# Patient Record
Sex: MALE | Race: WHITE | Employment: UNEMPLOYED | URBAN - METROPOLITAN AREA
[De-identification: names, ages, dates, MRNs, and addresses within clinical notes are randomized per-mention and may not be internally consistent; named-entity substitution may affect disease eponyms.]

---

## 2017-05-24 ENCOUNTER — ANESTHESIA EVENT (OUTPATIENT)
Dept: SURGERY | Age: 27
End: 2017-05-24
Payer: OTHER GOVERNMENT

## 2017-05-24 PROBLEM — L40.9 PSORIASIS: Status: ACTIVE | Noted: 2017-05-24

## 2017-05-24 PROBLEM — M67.51 PLICA SYNDROME OF RIGHT KNEE: Status: ACTIVE | Noted: 2017-05-24

## 2017-05-24 PROBLEM — S83.241A TEAR OF MEDIAL MENISCUS OF RIGHT KNEE: Status: ACTIVE | Noted: 2017-05-24

## 2017-05-24 NOTE — DISCHARGE INSTRUCTIONS
DR. Mark Anthony Caballero    POST OPERATIVE INSTRUCTIONS FOLLOWING ARTHROSCOPIC SURGERY OF KNEE  In order to continue your care at home, please follow these guidelines from Dr. Merly Sullivan. **Please do not drive, operate any machinery, or make important business decisions for twenty four hours after surgery due to the medications you have received. 1. First meal at home should be clear liquids and return to regular diet as tolerated. 2. An Active ice wrap is provided for your knee. Re-freeze it in your home freezer a necessary. Wear it for the first 2-3 days to decrease swelling and pain, then use as desired. 3. Elevate the surgical leg when sitting and sleeping to reduce swelling. DO NOT place a pillow directly under the knee, place it under your leg. 4. Practice quadriceps muscle tightening and straight leg raise exercises and ankle pumps several times each hour. Bend your knee as often as you need to for comfort. Remove the ice wrap to bend the knee at least 3 times daily. 5. Let pain be your guide to activity; too much pain, too much activity. 6. You will be given crutches in the hospital to use until you see Dr. Merly Sullivan. Begin weight bearing as tolerated with 2 crutches. Instructions:  Crutches, bad leg, good leg, flat foot. 7. A prescription will be provided for pain medication before leaving the office. An antiinflammatory may also be prescribed. Use Tylenol for headaches. AVOID aspirin and ibuprofen or other NSAID use. Please inform us of any known drug allergies. 8. You may shower 48 hours after surgery. The dressing may be removed after 48 hours. Cover the small incisions with band-aids. Change band-aids after your shower daily. DO NOT remove the steri-strips that cover your sutures if you have an open incision. 9. You have been given a follow-up appointment date. If this is not feasible or you have forgotten, please call the office the day after surgery to obtain an appointment. DO NOT swim, take a tub bath, or whirlpool, or immerse your wound until your first post operative check in the office. 10. REMINDER:  Begin your physical therapy as instructed at your teaching sessions. 11. If you develop a fever greater than 101 degrees, unexpected redness, or swelling in your lower extremity please call the office immediately. DISCHARGE SUMMARY from Your Nurse    PATIENT INSTRUCTIONS:    AFTER ANESTHESIA & SEDATION, and WHILE TAKING PAIN MEDICINE  After general anesthesia / intravenous sedation and the 24 hours following, and/or while taking prescription Opiates:  · Limit your activities  · Do not drive and operate hazardous machinery until you have been of all narcotics and sedatives for over 24 hours  · Do not make important personal or business decisions  · Do  not drink alcoholic beverages  · If you have not urinated within 8 hours after discharge, please contact your surgeon on call. SIGNS OF INFECTION  Report the following Signs of Infection or General Problems after surgery to your surgeon:  · Excessive pain, swelling, redness or odor of or around the surgical area  · Temperature over 101; Temperature over 100 if on medications that affect your ability to fight infections  · Nausea and vomiting lasting longer than 4 hours or if unable to take medications  · Any signs of decreased circulation or nerve impairment to extremity: change in color, persistent  numbness, tingling, coldness or increase pain  · If you have any questions. COUGH AND DEEP BREATHE  Breathing deep and coughing are very important exercises to do after surgery. Deep breathing and coughing open the little air tubes and air sacks in your lungs. You take deep breaths every day. You may not even notice - it is just something you do when you sigh or yawn. It is a natural exercise you do to keep these air passages open. After surgery, take deep breaths and cough, on purpose.       Coughing and deep breathing help prevent bronchitis and pneumonia after surgery. If you had chest or belly surgery, use a pillow as a \"hug naima\" and hold it tightly to your chest or belly when you cough. DIRECTIONS:  1. Take 10 to 15 slow deep breaths every hour while awake. 2. Breathe in deeply, and hold it for 2 seconds. 3. Exhale slowly through puckered lips, like blowing up a balloon. 4. After every 4th or 5th deep breath, hug your pillow to your chest or belly and give a hard, deep cough. Yes, it will probably hurt. But doing this exercise is very important part of healing after surgery. Take your pain medicine to help you do this exercise without too much pain. ANKLE PUMPS    Ankle pumps increase the circulation of oxygenated blood to your lower extremities and decrease your risk for circulation problems such as blood clots. They also stretch the muscles, tendons and ligaments in your foot and ankle, and prevent joint contracture in the ankle and foot, especially after surgeries on the legs. It is important to do ankle pump exercises regularly after surgery because immobility increases your risk for developing a blood clot. Your doctor may also have you take an Aspirin for the next few days as well. If your doctor did not ask you to take an Aspirin, consult with him before starting Aspirin therapy on your own. The exercise is quite simple. · Slowly point your foot forward, feeling the muscles on the top of your lower leg stretch, and hold this position for 5 seconds. · Next, pull your foot back toward you as far as possible, stretching the calf muscles, and hold that position for 5 seconds. · Repeat with the other foot. · Perform 10 repetitions every hour while awake for both ankles if possible (down and then up with the foot once is one repetition). You should feel gentle stretching of the muscles in your lower leg when doing this exercise.   If you feel pain, or your range of motion is limited, don't push too hard. Only go the limit your joint and muscles will let you go. If you have increasing pain, progressively worsening leg warmth or swelling, STOP the exercise and call your doctor. Other Wound Care information:    Cold Therapy Instructions    Your nurse will provide a cold therapy wrap based upon your surgery, need, and your doctor's orders. INSTRUCTIONS FOR USE:  All cooling wraps produce sustained periods of intense cold. NEVER PLACE PAD ON BARE SKIN OR HAVE CONTACT WITH BARE SKIN  Always Use An Insulating Barrier. Tissue injury can occur if these devices are used improperly. Follow your surgeons instructions carefully regarding the frequency, duration and breaks from cold therapy, and the total length of treatment. Check under the pad barrier every 2 hours for skin injury (see below.)      SIGNS OF SKIN INJURY:  STOP USE AND CONTACT YOUR SURGEON if any of the following occur: Increased pain, burning, increased swelling, itching, blisters, increased redness, discoloration, welts, or other change in skin condition. INDICATIONS:  Back, Hip, Knee or Shoulder Surgery and Post-Operative Treatment; Trauma; Orthopedic Rehabilitation      CONTRAINDICATIONS:  Cold therapy should not be used by persons with Diabetes, Raynaud's or other vasospastic disease, cold hypersensititvity, or compromised local circulation. Certain medical conditions make cold-induced injury more likely. Please consult with your healthcare provider before use. Below is information on the medication(s) your doctor is prescribing for you: The maximum daily dose of acetaminophen was discussed with the patient.  He was encouraged not to exceed 3,000 mg of acetaminophen during a 24 hour period and was asked to keep in mind that acetaminophen can also be found in many over-the-counter cold medications as well as narcotics that may be given for pain. The patient expresses understanding of these issues and questions were answered. 4 THINGS ABOUT PAIN MEDICINE I ALWAYS TALK ABOUT:  There are 4 side effects I always talk about for pain medications. 1. They make you sleepy and drowsy. Do not drive a car or operate machines while taking pain medication. Do not make any major decisions. Take a nap. Relax. Let your body recover from the affects of anesthesia and surgery. 2. Some people have quite a problem with itching and. ..  3. Nausea or vomiting. I mention these together because research tends to suggest there is a gene-related issue. While some have a hard time with these problems, others do not. These are expected and know side effects. Over-the-counter Benadryl® (the drug name is diphenhydramine) can help with the itching. Your doctor may also have given you some medicine for nausea. IF HE DID NOT, CALL HIM/HER. 4. Last but not least is the problem of constipation. All pain medicine can slow down the movement of food through the gut. The slower it goes, the worse it can be. Frankly, this means very hard poop. Hard poop just adds insult to the injury of surgery. And if you had tummy surgery, like having your gall bladder removed or a hernia repair, YOU DO NOT WANT THIS PROBLEM. There are 4 things I recommend. · Drink lots of fluids. For healthy people with no heart problems, this means at least 64 ounces of liquids or more per day. For example, a \"Big Gulp®\" from [employer] is 32 ounces. So you need to drink at least 2  \"Big Gulp®\" worth of fluids every day. If you have heart problems you may not be able to do this. Talk to your doctor about what you should do to prevent constipation. · Drinking fruit juice like apple, pear, or prune juice gives you extra \"BANG\" for your beverage. These drinks are high in natural fiber.   If you are a diabetic, drink sugar-free fluids with fiber additives (see next 2 points.) Avoid drinking extra fruit juice unless this is a regular part of your diet plan. · Eat extra fresh fruits and vegetables. · Add extra fiber-products. Fiber products like Metamucil®, Citrucel® or Benefiber® can help. These products are over-the-counter and you do not need a prescription from your doctor. If you have followed these recommendations and still have some difficulty having a good poop, take and over-the-counter stimulant like Dulcolax® (biscodyl)  or Senokot® (senna concentrate). These may help get things moving. Andrea Zamora MEDICATION AND   SIDE EFFECT GUIDE    The [facility] MEDICATION AND SIDE EFFECT GUIDE was provided to the PATIENT AND CARE PROVIDER. Information provided includes instruction about drug purpose and common side effects for the following medications:   · Oscar Maria  · NAPROXEN        Medication information added to discharge record on May 25, 2017 at 8:58 AM.      Some information we wish all of our patients to be familiar with and General Information for Healthy Lifestyle choices:    · Make a list of your current medications with your Primary Care Provider. · Update this list whenever your medications are discontinued, doses are changed, or new medications (including over-the-counter products like ibuprofen, vitamins, or herbal remedies) are added. · Carry medication information at all times in case of emergency situations      No smoking / No tobacco products / Avoid exposure to second hand smoke    Surgeon General's Warning:  Quitting smoking now greatly reduces serious risk to your health. Obesity, smoking, and sedentary lifestyle greatly increases your risk for illness. A healthy diet, regular physical exercise & weight monitoring are important for maintaining a healthy lifestyle. A Note About Congestive Heart Failure:   You may be retaining fluid if you have a history of heart failure or if you experience any of the following symptoms:      · Weight gain of 3 pounds or more overnight or 5 pounds in a week  · Increased swelling in our hands or feet  · Shortness of breath while lying flat in bed      Please call your doctor as soon as you notice any of these symptoms; do not wait until your next office visit. A Note About Strokes:  Recognize signs and symptoms of STROKE. The simple mnemonic, F.A.S.T., can help you remember signs of a stroke and what to do if you suspect a stroke is occuring to you or someone you are with:    F - Face looks uneven  A - Arms unable to move, or move evenly  S - Speech is slurred or non-existent  T - Time - CALL 911 as soon as signs and symptoms begin - DO NOT go to bed or wait to see if you get better - TIME IS BRAIN. Warning Signs of HEART ATTACK   Call 911 if you have these symptoms:     Chest discomfort. Most heart attacks involve discomfort in the center of the chest that lasts more than a few minutes, or that goes away and comes back. It can feel like uncomfortable pressure, squeezing, fullness, or pain.  Discomfort in other areas of the upper body. Symptoms can include pain or discomfort in one or both arms, the back, neck, jaw, or stomach.  Shortness of breath with or without chest discomfort.  Other signs may include breaking out in a cold sweat, nausea, or lightheadedness. Don't wait more than five minutes to call 911 - MINUTES MATTER! Fast action can save your life. Calling 911 is almost always the fastest way to get lifesaving treatment. Emergency Medical Services staff can begin treatment when they arrive -- up to an hour sooner than if someone gets to the hospital by car. AT THE COMPLETION OF DISCHARGE INSTRUCTION REVIEW, WE VERIFY:  The discharge information has been reviewed with the patient and caregiver. Questions have been asked and answered meeting patient and caregiver expectations. The patient and caregiver verbalized understanding.     Your discharge nurse was Arlene HELM, RN-BC       Board Certified - Pain Management      Other information found in your discharge envelope:  [x]     PRESCRIPTIONS  []     PHYSICAL THERAPY PRESCRIPTION  []     APPOINTMENT CARDS  []     [facility]ional Anesthesia Pamphlet for block or block with On-Q Catheter from Anesthesia  Service  [x]     Medical device information sheets/pamphlets from their    []     School/work excuse note. []     /parent work excuse note. The following personal items collected during your admission for safe keeping are returned to you:     Dental Appliance: Dental Appliances: None  Vi gracy: Visual Aid: None  Hearing Aid:    Jewelry: Jewelry: None  Clothing: Clothing: Shirt, Shorts, Footwear, Undergarments, Socks  Other Valuables:  Other Valuables: None  Valuables sent to safe: Personal Items Sent to Safe: none

## 2017-05-25 ENCOUNTER — ANESTHESIA (OUTPATIENT)
Dept: SURGERY | Age: 27
End: 2017-05-25
Payer: OTHER GOVERNMENT

## 2017-05-25 ENCOUNTER — HOSPITAL ENCOUNTER (OUTPATIENT)
Age: 27
Setting detail: OUTPATIENT SURGERY
Discharge: HOME | End: 2017-05-25
Attending: ORTHOPAEDIC SURGERY | Admitting: ORTHOPAEDIC SURGERY
Payer: OTHER GOVERNMENT

## 2017-05-25 VITALS
HEART RATE: 79 BPM | BODY MASS INDEX: 34.1 KG/M2 | OXYGEN SATURATION: 90 % | RESPIRATION RATE: 18 BRPM | TEMPERATURE: 98 F | HEIGHT: 78 IN | DIASTOLIC BLOOD PRESSURE: 81 MMHG | WEIGHT: 294.75 LBS | SYSTOLIC BLOOD PRESSURE: 134 MMHG

## 2017-05-25 PROCEDURE — 76060000061 HC AMB SURG ANES 0.5 TO 1 HR: Performed by: ORTHOPAEDIC SURGERY

## 2017-05-25 PROCEDURE — 77030011930 HC WND ARTHRO ABLT S&N -C: Performed by: ORTHOPAEDIC SURGERY

## 2017-05-25 PROCEDURE — 74011250636 HC RX REV CODE- 250/636: Performed by: ANESTHESIOLOGY

## 2017-05-25 PROCEDURE — 76030000000 HC AMB SURG OR TIME 0.5 TO 1: Performed by: ORTHOPAEDIC SURGERY

## 2017-05-25 PROCEDURE — 74011000250 HC RX REV CODE- 250

## 2017-05-25 PROCEDURE — 77030032435

## 2017-05-25 PROCEDURE — 74011250636 HC RX REV CODE- 250/636: Performed by: ORTHOPAEDIC SURGERY

## 2017-05-25 PROCEDURE — 74011250636 HC RX REV CODE- 250/636

## 2017-05-25 PROCEDURE — 77030020782 HC GWN BAIR PAWS FLX 3M -B

## 2017-05-25 PROCEDURE — 77030002916 HC SUT ETHLN J&J -A: Performed by: ORTHOPAEDIC SURGERY

## 2017-05-25 PROCEDURE — 76210000040 HC AMBSU PH I REC FIRST 0.5 HR: Performed by: ORTHOPAEDIC SURGERY

## 2017-05-25 PROCEDURE — 76210000050 HC AMBSU PH II REC 0.5 TO 1 HR: Performed by: ORTHOPAEDIC SURGERY

## 2017-05-25 PROCEDURE — 77030028907 HC WRP KNEE WO BGS SOLM -B

## 2017-05-25 PROCEDURE — 77030008495 HC TBNG ARTHSC IRR CNMD -B: Performed by: ORTHOPAEDIC SURGERY

## 2017-05-25 PROCEDURE — 77030018835 HC SOL IRR LR ICUM -A: Performed by: ORTHOPAEDIC SURGERY

## 2017-05-25 PROCEDURE — 77030020143 HC AIRWY LARYN INTUB CGAS -A: Performed by: ANESTHESIOLOGY

## 2017-05-25 PROCEDURE — 77030000032 HC CUF TRNQT ZIMM -B: Performed by: ORTHOPAEDIC SURGERY

## 2017-05-25 PROCEDURE — 77030006877 HC BLD SHV FLL RAD S&N -B: Performed by: ORTHOPAEDIC SURGERY

## 2017-05-25 PROCEDURE — 74011000250 HC RX REV CODE- 250: Performed by: ORTHOPAEDIC SURGERY

## 2017-05-25 PROCEDURE — 77030012893

## 2017-05-25 RX ORDER — LIDOCAINE HYDROCHLORIDE 10 MG/ML
0.1 INJECTION, SOLUTION EPIDURAL; INFILTRATION; INTRACAUDAL; PERINEURAL AS NEEDED
Status: DISCONTINUED | OUTPATIENT
Start: 2017-05-25 | End: 2017-05-25 | Stop reason: HOSPADM

## 2017-05-25 RX ORDER — FENTANYL CITRATE 50 UG/ML
INJECTION, SOLUTION INTRAMUSCULAR; INTRAVENOUS AS NEEDED
Status: DISCONTINUED | OUTPATIENT
Start: 2017-05-25 | End: 2017-05-25 | Stop reason: HOSPADM

## 2017-05-25 RX ORDER — DEXAMETHASONE SODIUM PHOSPHATE 4 MG/ML
INJECTION, SOLUTION INTRA-ARTICULAR; INTRALESIONAL; INTRAMUSCULAR; INTRAVENOUS; SOFT TISSUE AS NEEDED
Status: DISCONTINUED | OUTPATIENT
Start: 2017-05-25 | End: 2017-05-25 | Stop reason: HOSPADM

## 2017-05-25 RX ORDER — PROPOFOL 10 MG/ML
INJECTION, EMULSION INTRAVENOUS AS NEEDED
Status: DISCONTINUED | OUTPATIENT
Start: 2017-05-25 | End: 2017-05-25 | Stop reason: HOSPADM

## 2017-05-25 RX ORDER — LIDOCAINE HYDROCHLORIDE 20 MG/ML
INJECTION, SOLUTION EPIDURAL; INFILTRATION; INTRACAUDAL; PERINEURAL AS NEEDED
Status: DISCONTINUED | OUTPATIENT
Start: 2017-05-25 | End: 2017-05-25 | Stop reason: HOSPADM

## 2017-05-25 RX ORDER — SODIUM CHLORIDE, SODIUM LACTATE, POTASSIUM CHLORIDE, CALCIUM CHLORIDE 600; 310; 30; 20 MG/100ML; MG/100ML; MG/100ML; MG/100ML
100 INJECTION, SOLUTION INTRAVENOUS CONTINUOUS
Status: DISCONTINUED | OUTPATIENT
Start: 2017-05-25 | End: 2017-05-25 | Stop reason: HOSPADM

## 2017-05-25 RX ORDER — SODIUM CHLORIDE 0.9 % (FLUSH) 0.9 %
5-10 SYRINGE (ML) INJECTION EVERY 8 HOURS
Status: DISCONTINUED | OUTPATIENT
Start: 2017-05-25 | End: 2017-05-25 | Stop reason: HOSPADM

## 2017-05-25 RX ORDER — ONDANSETRON 2 MG/ML
4 INJECTION INTRAMUSCULAR; INTRAVENOUS AS NEEDED
Status: DISCONTINUED | OUTPATIENT
Start: 2017-05-25 | End: 2017-05-25 | Stop reason: HOSPADM

## 2017-05-25 RX ORDER — MIDAZOLAM HYDROCHLORIDE 1 MG/ML
INJECTION, SOLUTION INTRAMUSCULAR; INTRAVENOUS AS NEEDED
Status: DISCONTINUED | OUTPATIENT
Start: 2017-05-25 | End: 2017-05-25 | Stop reason: HOSPADM

## 2017-05-25 RX ORDER — SODIUM CHLORIDE 0.9 % (FLUSH) 0.9 %
5-10 SYRINGE (ML) INJECTION AS NEEDED
Status: DISCONTINUED | OUTPATIENT
Start: 2017-05-25 | End: 2017-05-25 | Stop reason: HOSPADM

## 2017-05-25 RX ORDER — HYDROMORPHONE HYDROCHLORIDE 1 MG/ML
.25-1 INJECTION, SOLUTION INTRAMUSCULAR; INTRAVENOUS; SUBCUTANEOUS
Status: DISCONTINUED | OUTPATIENT
Start: 2017-05-25 | End: 2017-05-25 | Stop reason: HOSPADM

## 2017-05-25 RX ORDER — SODIUM CHLORIDE, SODIUM LACTATE, POTASSIUM CHLORIDE, CALCIUM CHLORIDE 600; 310; 30; 20 MG/100ML; MG/100ML; MG/100ML; MG/100ML
125 INJECTION, SOLUTION INTRAVENOUS CONTINUOUS
Status: DISCONTINUED | OUTPATIENT
Start: 2017-05-25 | End: 2017-05-25 | Stop reason: HOSPADM

## 2017-05-25 RX ORDER — IBUPROFEN 200 MG
400 TABLET ORAL
COMMUNITY

## 2017-05-25 RX ORDER — DIPHENHYDRAMINE HYDROCHLORIDE 50 MG/ML
12.5 INJECTION, SOLUTION INTRAMUSCULAR; INTRAVENOUS AS NEEDED
Status: DISCONTINUED | OUTPATIENT
Start: 2017-05-25 | End: 2017-05-25 | Stop reason: HOSPADM

## 2017-05-25 RX ORDER — ONDANSETRON 2 MG/ML
INJECTION INTRAMUSCULAR; INTRAVENOUS AS NEEDED
Status: DISCONTINUED | OUTPATIENT
Start: 2017-05-25 | End: 2017-05-25 | Stop reason: HOSPADM

## 2017-05-25 RX ADMIN — FENTANYL CITRATE 50 MCG: 50 INJECTION, SOLUTION INTRAMUSCULAR; INTRAVENOUS at 07:53

## 2017-05-25 RX ADMIN — HYDROMORPHONE HYDROCHLORIDE 1 MG: 1 INJECTION, SOLUTION INTRAMUSCULAR; INTRAVENOUS; SUBCUTANEOUS at 09:38

## 2017-05-25 RX ADMIN — HYDROMORPHONE HYDROCHLORIDE 1 MG: 1 INJECTION, SOLUTION INTRAMUSCULAR; INTRAVENOUS; SUBCUTANEOUS at 09:20

## 2017-05-25 RX ADMIN — DEXAMETHASONE SODIUM PHOSPHATE 4 MG: 4 INJECTION, SOLUTION INTRA-ARTICULAR; INTRALESIONAL; INTRAMUSCULAR; INTRAVENOUS; SOFT TISSUE at 08:15

## 2017-05-25 RX ADMIN — LIDOCAINE HYDROCHLORIDE 80 MG: 20 INJECTION, SOLUTION EPIDURAL; INFILTRATION; INTRACAUDAL; PERINEURAL at 07:58

## 2017-05-25 RX ADMIN — ONDANSETRON 4 MG: 2 INJECTION INTRAMUSCULAR; INTRAVENOUS at 08:15

## 2017-05-25 RX ADMIN — FENTANYL CITRATE 50 MCG: 50 INJECTION, SOLUTION INTRAMUSCULAR; INTRAVENOUS at 08:15

## 2017-05-25 RX ADMIN — MIDAZOLAM HYDROCHLORIDE 2 MG: 1 INJECTION, SOLUTION INTRAMUSCULAR; INTRAVENOUS at 07:58

## 2017-05-25 RX ADMIN — HYDROMORPHONE HYDROCHLORIDE 1 MG: 1 INJECTION, SOLUTION INTRAMUSCULAR; INTRAVENOUS; SUBCUTANEOUS at 09:08

## 2017-05-25 RX ADMIN — MIDAZOLAM HYDROCHLORIDE 3 MG: 1 INJECTION, SOLUTION INTRAMUSCULAR; INTRAVENOUS at 07:53

## 2017-05-25 RX ADMIN — SODIUM CHLORIDE, POTASSIUM CHLORIDE, SODIUM LACTATE AND CALCIUM CHLORIDE: 600; 310; 30; 20 INJECTION, SOLUTION INTRAVENOUS at 07:16

## 2017-05-25 RX ADMIN — PROPOFOL 200 MG: 10 INJECTION, EMULSION INTRAVENOUS at 07:58

## 2017-05-25 RX ADMIN — CEFAZOLIN 3 G: 1 INJECTION, POWDER, FOR SOLUTION INTRAMUSCULAR; INTRAVENOUS; PARENTERAL at 07:58

## 2017-05-25 NOTE — IP AVS SNAPSHOT
[facility] 
 
 
 [address] 
[phone] Patient: Santa Cote MRN: [id number] MHB:[id number] You are allergic to the following No active allergies Recent Documentation Height Weight BMI Smoking Status 2.057 m 133.7 kg 31.59 kg/m2 Never Smoker Emergency Contacts Name Discharge Info Relation Home Work Mobile Anthony Morales CAREGIVER [3] Mother [14]   [phone] Shobha Barber DISCHARGE CAREGIVER [3] Girlfriend [18]   [phone] About your hospitalization You were admitted on:  May 25, 2017 You last received care in the:  [facility] PACU You were discharged on:  May 25, 2017 Unit phone number:  [phone] Why you were hospitalized Your primary diagnosis was:  Not on File Your diagnoses also included:  Tear Of Medial Meniscus Of Right Knee, Plica Syndrome Of Right Knee Providers Seen During Your Hospitalizations Provider Role Specialty Primary office phone Narciso Roach MD Attending Provider Orthopedic Surgery [phone] Your Primary Care Physician (PCP) Primary Care Physician Office Phone Office Fax NONE ** None ** ** None ** Follow-up Information Follow up With Details Comments Contact Info None   None ([phone]) Patient stated that they have no PCP Narciso Roach MD Schedule an appointment as soon as possible for a visit in 2 week(s)  . [address] [facility] [address] [facility] 
[phone] Current Discharge Medication List  
  
CONTINUE these medications which have NOT CHANGED Dose & Instructions Dispensing Information Comments Morning Noon Evening Bedtime  
 ibuprofen 200 mg tablet Commonly known as:  MOTRIN Your last dose was:     
   
Your next dose is:    
   
   
 Dose:  400 mg  
 Take 400 mg by mouth every six (6) hours as needed for Pain. Refills:  0 Discharge Instructions DR. Filomena King 
 
POST OPERATIVE INSTRUCTIONS FOLLOWING ARTHROSCOPIC SURGERY OF KNEE In order to continue your care at home, please follow these guidelines from Dr. Raúl Richardson. **Please do not drive, operate any machinery, or make important business decisions for twenty four hours after surgery due to the medications you have received. 1. First meal at home should be clear liquids and return to regular diet as tolerated. 2. An Active ice wrap is provided for your knee. Re-freeze it in your home freezer a necessary. Wear it for the first 2-3 days to decrease swelling and pain, then use as desired. 3. Elevate the surgical leg when sitting and sleeping to reduce swelling. DO NOT place a pillow directly under the knee, place it under your leg. 4. Practice quadriceps muscle tightening and straight leg raise exercises and ankle pumps several times each hour. Bend your knee as often as you need to for comfort. Remove the ice wrap to bend the knee at least 3 times daily. 5. Let pain be your guide to activity; too much pain, too much activity. 6. You will be given crutches in the hospital to use until you see Dr. Raúl Richardson. Begin weight bearing as tolerated with 2 crutches. Instructions:  Crutches, bad leg, good leg, flat foot. 7. A prescription will be provided for pain medication before leaving the office. An antiinflammatory may also be prescribed. Use Tylenol for headaches. AVOID aspirin and ibuprofen or other NSAID use. Please inform us of any known drug allergies. 8. You may shower 48 hours after surgery. The dressing may be removed after 48 hours. Cover the small incisions with band-aids. Change band-aids after your shower daily. DO NOT remove the steri-strips that cover your sutures if you have an open incision. 9. You have been given a follow-up appointment date. If this is not feasible or you have forgotten, please call the office the day after surgery to obtain an appointment. DO NOT swim, take a tub bath, or whirlpool, or immerse your wound until your first post operative check in the office. 10. REMINDER:  Begin your physical therapy as instructed at your teaching sessions. 11. If you develop a fever greater than 101 degrees, unexpected redness, or swelling in your lower extremity please call the office immediately. DISCHARGE SUMMARY from Your Nurse PATIENT INSTRUCTIONS: 
 
AFTER ANESTHESIA & SEDATION, and WHILE TAKING PAIN MEDICINE After general anesthesia / intravenous sedation and the 24 hours following, and/or while taking prescription Opiates: · Limit your activities · Do not drive and operate hazardous machinery until you have been of all narcotics and sedatives for over 24 hours · Do not make important personal or business decisions · Do  not drink alcoholic beverages · If you have not urinated within 8 hours after discharge, please contact your surgeon on call. SIGNS OF INFECTION Report the following Signs of Infection or General Problems after surgery to your surgeon: 
· Excessive pain, swelling, redness or odor of or around the surgical area · Temperature over 101; Temperature over 100 if on medications that affect your ability to fight infections · Nausea and vomiting lasting longer than 4 hours or if unable to take medications · Any signs of decreased circulation or nerve impairment to extremity: change in color, persistent  numbness, tingling, coldness or increase pain · If you have any questions. [address] Breathing deep and coughing are very important exercises to do after surgery. Deep breathing and coughing open the little air tubes and air sacks in your lungs. You take deep breaths every day.   You may not even notice - it is just something you do when you sigh or yawn. It is a natural exercise you do to keep these air passages open. After surgery, take deep breaths and cough, on purpose. Coughing and deep breathing help prevent bronchitis and pneumonia after surgery. If you had chest or belly surgery, use a pillow as a \"hug naima\" and hold it tightly to your chest or belly when you cough. DIRECTIONS: 
1. Take 10 to 15 slow deep breaths every hour while awake. 2. Breathe in deeply, and hold it for 2 seconds. 3. Exhale slowly through puckered lips, like blowing up a balloon. 4. After every 4th or 5th deep breath, hug your pillow to your chest or belly and give a hard, deep cough. Yes, it will probably hurt. But doing this exercise is very important part of healing after surgery. Take your pain medicine to help you do this exercise without too much pain. ANKLE PUMPS Ankle pumps increase the circulation of oxygenated blood to your lower extremities and decrease your risk for circulation problems such as blood clots. They also stretch the muscles, tendons and ligaments in your foot and ankle, and prevent joint contracture in the ankle and foot, especially after surgeries on the legs. It is important to do ankle pump exercises regularly after surgery because immobility increases your risk for developing a blood clot. Your doctor may also have you take an Aspirin for the next few days as well. If your doctor did not ask you to take an Aspirin, consult with him before starting Aspirin therapy on your own. The exercise is quite simple. · Slowly point your foot forward, feeling the muscles on the top of your lower leg stretch, and hold this position for 5 seconds. · Next, pull your foot back toward you as far as possible, stretching the calf muscles, and hold that position for 5 seconds. · Repeat with the other foot. · Perform 10 repetitions every hour while awake for both ankles if possible (down and then up with the foot once is one repetition). You should feel gentle stretching of the muscles in your lower leg when doing this exercise. If you feel pain, or your range of motion is limited, don't push too hard. Only go the limit your joint and muscles will let you go. If you have increasing pain, progressively worsening leg warmth or swelling, STOP the exercise and call your doctor. Other Wound Care information: 
 
Cold Therapy Instructions Your nurse will provide a cold therapy wrap based upon your surgery, need, and your doctor's orders. INSTRUCTIONS FOR USE: 
All cooling wraps produce sustained periods of intense cold. NEVER PLACE PAD ON BARE SKIN OR HAVE CONTACT WITH BARE SKIN Always Use An Insulating Barrier. Tissue injury can occur if these devices are used improperly. Follow your surgeons instructions carefully regarding the frequency, duration and breaks from cold therapy, and the total length of treatment. Check under the pad barrier every 2 hours for skin injury (see below.) SIGNS OF SKIN INJURY: 
STOP USE AND CONTACT YOUR SURGEON if any of the following occur: Increased pain, burning, increased swelling, itching, blisters, increased redness, discoloration, welts, or other change in skin condition. INDICATIONS: 
Back, Hip, Knee or Shoulder Surgery and Post-Operative Treatment; Trauma; Orthopedic Rehabilitation CONTRAINDICATIONS: 
Cold therapy should not be used by persons with Diabetes, Raynaud's or other vasospastic disease, cold hypersensititvity, or compromised local circulation. Certain medical conditions make cold-induced injury more likely. Please consult with your healthcare provider before use. Below is information on the medication(s) your doctor is prescribing for you: The maximum daily dose of acetaminophen was discussed with the patient. He was encouraged not to exceed 3,000 mg of acetaminophen during a 24 hour period and was asked to keep in mind that acetaminophen can also be found in many over-the-counter cold medications as well as narcotics that may be given for pain. The patient expresses understanding of these issues and questions were answered. 4 THINGS ABOUT PAIN MEDICINE I ALWAYS TALK ABOUT: 
There are 4 side effects I always talk about for pain medications. 1. They make you sleepy and drowsy. Do not drive a car or operate machines while taking pain medication. Do not make any major decisions. Take a nap. Relax. Let your body recover from the affects of anesthesia and surgery. 2. Some people have quite a problem with itching and. .. 3. Nausea or vomiting. I mention these together because research tends to suggest there is a gene-related issue. While some have a hard time with these problems, others do not. These are expected and know side effects. Over-the-counter Benadryl® (the drug name is diphenhydramine) can help with the itching. Your doctor may also have given you some medicine for nausea. IF HE DID NOT, CALL HIM/HER. 4. Last but not least is the problem of constipation. All pain medicine can slow down the movement of food through the gut. The slower it goes, the worse it can be. Frankly, this means very hard poop. Hard poop just adds insult to the injury of surgery. And if you had tummy surgery, like having your gall bladder removed or a hernia repair, YOU DO NOT WANT THIS PROBLEM. There are 4 things I recommend. · Drink lots of fluids. For healthy people with no heart problems, this means at least 64 ounces of liquids or more per day. For example, a \"Big Gulp®\" from [employer] is 32 ounces. So you need to drink at least 2  \"Big Gulp®\" worth of fluids every day.   If you have heart problems you may not be able to do this. Talk to your doctor about what you should do to prevent constipation. · Drinking fruit juice like apple, pear, or prune juice gives you extra \"BANG\" for your beverage. These drinks are high in natural fiber. If you are a diabetic, drink sugar-free fluids with fiber additives (see next 2 points.)  Avoid drinking extra fruit juice unless this is a regular part of your diet plan. · Eat extra fresh fruits and vegetables. · Add extra fiber-products. Fiber products like Metamucil®, Citrucel® or Benefiber® can help. These products are over-the-counter and you do not need a prescription from your doctor. If you have followed these recommendations and still have some difficulty having a good poop, take and over-the-counter stimulant like Dulcolax® (biscodyl)  or Senokot® (senna concentrate). These may help get things moving. [address] EFFECT GUIDE The [address] EFFECT GUIDE was provided to the PATIENT AND CARE PROVIDER. Information provided includes instruction about drug purpose and common side effects for the following medications:  
· NORCO 
· NAPROXEN Medication information added to discharge record on May 25, 2017 at 8:58 AM. Some information we wish all of our patients to be familiar with and General Information for Healthy Lifestyle choices: · Make a list of your current medications with your Primary Care Provider. · Update this list whenever your medications are discontinued, doses are changed, or new medications (including over-the-counter products like ibuprofen, vitamins, or herbal remedies) are added. · Carry medication information at all times in case of emergency situations No smoking / No tobacco products / Avoid exposure to second hand smoke Surgeon General's Warning:  Quitting smoking now greatly reduces serious risk to your health. Obesity, smoking, and sedentary lifestyle greatly increases your risk for illness. A healthy diet, regular physical exercise & weight monitoring are important for maintaining a healthy lifestyle. A Note About Congestive Heart Failure: You may be retaining fluid if you have a history of heart failure or if you experience any of the following symptoms:   
 
· Weight gain of 3 pounds or more overnight or 5 pounds in a week · Increased swelling in our hands or feet · Shortness of breath while lying flat in bed Please call your doctor as soon as you notice any of these symptoms; do not wait until your next office visit. A Note About Strokes: 
Recognize signs and symptoms of STROKE. The simple mnemonic, F.A.S.T., can help you remember signs of a stroke and what to do if you suspect a stroke is occuring to you or someone you are with: 
 
F - Face looks uneven A - Arms unable to move, or move evenly S - Speech is slurred or non-existent T - Time - CALL 911 as soon as signs and symptoms begin - DO NOT go to bed or wait to see if you get better - TIME IS BRAIN. Warning Signs of HEART ATTACK Call 911 if you have these symptoms: 
 
? Chest discomfort. Most heart attacks involve discomfort in the center of the chest that lasts more than a few minutes, or that goes away and comes back. It can feel like uncomfortable pressure, squeezing, fullness, or pain. ? Discomfort in other areas of the upper body. Symptoms can include pain or discomfort in one or both arms, the back, neck, jaw, or stomach. ? Shortness of breath with or without chest discomfort. ? Other signs may include breaking out in a cold sweat, nausea, or lightheadedness. Don't wait more than five minutes to call [address]! Fast action can save your life. Calling 911 is almost always the fastest way to get lifesaving treatment.  Emergency Medical Services staff can begin treatment when they arrive  up to an hour sooner than if someone gets to the hospital by car. AT THE COMPLETION OF DISCHARGE INSTRUCTION REVIEW, WE VERIFY: 
The discharge information has been reviewed with the patient and caregiver. Questions have been asked and answered meeting patient and caregiver expectations. The patient and caregiver verbalized understanding. Your discharge nurse was Hernesto HELM, RN-BC Board Certified - Pain Management Other information found in your discharge envelope: PRESCRIPTIONS PHYSICAL THERAPY PRESCRIPTION 
     APPOINTMENT CARDS [facility] Pamphlet for block or block with On-Q Catheter from Anesthesia  Service Medical device information sheets/pamphlets from their  School/work excuse note. /parent work excuse note. The following personal items collected during your admission for safe keeping are returned to you:  
 
Dental Appliance: Dental Appliances: None Vi gracy: Visual Aid: None Hearing Aid:   
Jewelry: Jewelry: None Clothing: Clothing: Shirt, Shorts, Footwear, Undergarments, Socks Other Valuables: Other Valuables: None Valuables sent to safe: Personal Items Sent to Safe: none Discharge Orders None Introducing [facility]! Warren Hughes introduces Plugaround patient portal. Now you can access parts of your medical record, email your doctor's office, and request medication refills online. 1. In your internet browser, go to [email or web address] 2. Click on the First Time User? Click Here link in the Sign In box. You will see the New Member Sign Up page. 3. Enter your Plugaround Access Code exactly as it appears below. You will not need to use this code after youve completed the sign-up process. If you do not sign up before the expiration date, you must request a new code. · Plugaround Access Code: [email or web address] Expires: 8/22/2017  2:23 PM 
 
 4. Enter the last four digits of your Social Security Number (xxxx) and Date of Birth (mm/dd/yyyy) as indicated and click Submit. You will be taken to the next sign-up page. 5. Create a Drizly ID. This will be your Drizly login ID and cannot be changed, so think of one that is secure and easy to remember. 6. Create a Drizly password. You can change your password at any time. 7. Enter your Password Reset Question and Answer. This can be used at a later time if you forget your password. 8. Enter your e-mail address. You will receive e-mail notification when new information is available in [address] 9. Click Sign Up. You can now view and download portions of your medical record. 10. Click the Download Summary menu link to download a portable copy of your medical information. If you have questions, please visit the Frequently Asked Questions section of the Drizly website. Remember, Drizly is NOT to be used for urgent needs. For medical emergencies, dial 911. Now available from your iPhone and Android! General Information Please provide this summary of care documentation to your next provider. Patient Signature:  ____________________________________________________________ Date:  ____________________________________________________________  
  
Torito Lindaa Provider Signature:  ____________________________________________________________ Date:  ____________________________________________________________

## 2017-05-25 NOTE — OP NOTES
[facility]   [address] [facility], [address]   OP NOTE       Name:  Kori Messina   MR#:  [id number]   :  1990   Account #:  [de-identified]    Surgery Date:  2017   Date of Adm:  2017       PREOPERATIVE DIAGNOSIS: Right knee pathologic plica versus   anterior horn medial meniscus tear. POSTOPERATIVE DIAGNOSES   1. Small anterior horn free edge tear. 2. Hypertrophic fat pad and pathologic plica. 3. Limited small area of chondromalacia patella at the inferior pole of   the patella. PROCEDURES PERFORMED   1. Right knee arthroscopic partial medial meniscectomy. 2. Arthroscopic debridement of hypertrophic fat pad and plica resection. SURGEON: Kenna Avendano MD    ASSISTANT: Arleth Beasley PA-C    ESTIMATED BLOOD LOSS: none    SPECIMENS REMOVED:none    ANESTHESIA: General.     INDICATIONS: The patient had a direct blow with a piece of equipment   at work that hit directly into his knee several months ago and has been   struggling with persistent kneecap pain. He is a very athletic reserve   soldier and workman, who has had persistent knee pain since this   tissue. MRI was not clear if there was a definitive meniscus tear, but   his exam consistently felt like a meniscus tear as well or a pathologic   plica. After failure of injection and rehabilitation for several months, we   decided to move forward with arthroscopic evaluation to deal with this   issue. DESCRIPTION OF PROCEDURE: The patient was placed on the   table in supine position, prepped and draped in the usual sterile   manner. A scope placed in the anterolateral portal revealed the   patellofemoral surface that was smooth. The most inferior pole of the   patella had some softening or grade 1-2 chondromalacia.  No full-  thickness defects and the trochlea was smooth, however, the entire   50% of the medial part of the trochlea was obscured by hypertrophic   fat pad and pathologic plica. We resected this first with back biter   shaving, fat pad and then with a hand tool, cutting the thickened plica   that was crossing at least a third of the patellofemoral joint. The   hypertrophic fat pad proceeded down into the intercondylar notch. We   had to debride all of that out with a combination of shaver and   electrocautery and then stopped all bleeders with electrocautery. The   patient's ACL and PCL were intact behind. The rest of the medial   meniscus and medial chondral surfaces, both femoral and tibial with   pristine and the lateral meniscus was normal with normal lateral   chondral surfaces. We irrigated copiously and closed the wounds. The patient went to the   recovery room in good condition after placing Xylocaine in the knee   for postop anesthesia and closing the wounds with suture. There were   no complications.         MD Erica Pearson / Giuseppe.[facility]   D:  05/25/2017   08:47   T:  05/25/2017   10:25   Job #:  [id number]

## 2017-05-25 NOTE — ANESTHESIA POSTPROCEDURE EVALUATION
Post-Anesthesia Evaluation and Assessment    Patient: Shakeel Fernandez MRN: [id number]  SSN: [id number]    YOB: 1990  Age: 28 y.o. Sex: male       Cardiovascular Function/Vital Signs  Visit Vitals    /81    Pulse 79    Temp 36.7 °C (98 °F)    Resp 18    Ht 6' 9\" (2.057 m)    Wt 133.7 kg (294 lb 12.1 oz)    SpO2 90%    BMI 31.59 kg/m2       Patient is status post general anesthesia for Procedure(s):  RIGHT KNEE ARTHROSCOPY, PARTIAL MEDIAL MENISCECTOMY, ARTHROSCOPIC DEBRIDEMENT HYPERTROPHIC FAT PAD. Nausea/Vomiting: None    Postoperative hydration reviewed and adequate. Pain:  Pain Scale 1: Numeric (0 - 10) (05/25/17 0945)  Pain Intensity 1: 5 (05/25/17 0945)   Managed    Neurological Status:   Neuro (WDL): Within Defined Limits (05/25/17 0945)  Neuro  Neurologic State: Unresponsive;Lethargic; Anesthetized (05/25/17 0845)   At baseline    Mental Status and Level of Consciousness: Arousable    Pulmonary Status:   O2 Device: Room air (05/25/17 0855)   Adequate oxygenation and airway patent    Complications related to anesthesia: None    Post-anesthesia assessment completed.  No concerns    Signed By: Brennen Aviles MD     May 25, 2017

## 2017-05-25 NOTE — PERIOP NOTES
PACU IN REPORT FROM ANESTHESIA    Verbal report received from   [facility]   CRNA  following General for Procedure(s) (LRB):  RIGHT KNEE ARTHROSCOPY, PARTIAL MEDIAL MENISCECTOMY, ARTHROSCOPIC DEBRIDEMENT HYPERTROPHIC FAT PAD (Right) by  Francoise Abel MD.    Note the anesthesia record for medications given intraoperatively. Brief Initial Visual Assessment:    Airway is:   Patent    and arrived with   Oral Airway   In place. Respiratory pattern is:    Even, Non-labored. Patient is:   alert and oriented x 0 (Sedated.)    Skin is:   Pink, Warm and Dry. Membranes are:    Pink and Moist.     Patient is in:    No Acute Discomfort. 0 /10 pain using   A.N.V.  Scale. - Note E-MAR for medications administered. Note assessments documented in flowsheets;any assessment variants to be found in comments or narrative perioperative nurse notes.        Post-anesthesia care now assumed, record signed by Randy HELM, RN-BC

## 2017-05-25 NOTE — BRIEF OP NOTE
BRIEF OPERATIVE NOTE    Date of Procedure: 5/25/2017   Preoperative Diagnosis: RIGHT KNEE PATHOLOGICAL PLICA VERSUS MEDIAL MENISCUS TEAR  Postoperative Diagnosis: RIGHT KNEE SMALL ANTERIOR HORN FREE EDGE TEAR,HYPERTROPHIC FAT PAD,CHONDROMALACIA INFERIOR POLE of PATELLA    Procedure(s):  RIGHT KNEE ARTHROSCOPY, PARTIAL MEDIAL MENISCECTOMY, ARTHROSCOPIC DEBRIDEMENT HYPERTROPHIC FAT PAD  Surgeon(s) and Role:     * Yadiel Galan MD - Primary         Assistant Staff:  Physician Assistant: Memo Real PA-C    Surgical Staff:  Circ-1: Migue Cobos RN  Physician Assistant: Memo Real PA-C  Scrub Tech-1: Sammie Dorsey  Float Staff: María Elena Wright RN  Event Time In   Incision Start 0815   Incision Close      Anesthesia: General   Estimated Blood Loss: less than 10 cc  Specimens: * No specimens in log *   Findings: see above  Complications: none  Implants: * No implants in log *

## 2017-05-25 NOTE — ANESTHESIA PREPROCEDURE EVALUATION
Anesthetic History   No history of anesthetic complications            Review of Systems / Medical History  Patient summary reviewed, nursing notes reviewed and pertinent labs reviewed    Pulmonary  Within defined limits                 Neuro/Psych   Within defined limits           Cardiovascular  Within defined limits                Exercise tolerance: >4 METS     GI/Hepatic/Renal  Within defined limits              Endo/Other  Within defined limits           Other Findings              Physical Exam    Airway  Mallampati: I  TM Distance: 4 - 6 cm  Neck ROM: normal range of motion   Mouth opening: Normal     Cardiovascular    Rhythm: regular  Rate: normal         Dental    Dentition: Upper dentition intact and Lower dentition intact  Comments: Missing a few teeth, nothing loose   Pulmonary  Breath sounds clear to auscultation               Abdominal         Other Findings            Anesthetic Plan    ASA: 1  Anesthesia type: general          Induction: Intravenous  Anesthetic plan and risks discussed with: Patient

## 2017-05-25 NOTE — PROGRESS NOTES
POST ANESTHESIA CARE   DISCHARGE or TRANSFER NOTE    Zara Alcantara was   discharged     via   wheel chair     To  private vehicle    . Patient was escorted by volunteer     . Patient verbalized   appreciation and was very pleased with care received   throughout their stay. Patient was discharged in   pleasant mood  . Pain at discharge/transfer was    5 /10. Discharge, medication and follow-up instructions were verbalized as understood prior to discharge. All personal belongings have been returned to patient, and patient/family verbally confirm receiving belongings as all present.     Rodney LEMUSN RN-BC

## 2018-08-07 ENCOUNTER — HOSPITAL ENCOUNTER (OUTPATIENT)
Dept: MRI IMAGING | Age: 28
Discharge: HOME | End: 2018-08-07
Attending: ORTHOPAEDIC SURGERY
Payer: OTHER GOVERNMENT

## 2018-08-07 DIAGNOSIS — S93.402D SPRAIN OF LIGAMENT OF LEFT ANKLE, SUBSEQUENT ENCOUNTER: ICD-10-CM

## 2018-08-07 PROCEDURE — 73721 MRI JNT OF LWR EXTRE W/O DYE: CPT

## 2020-03-30 ENCOUNTER — HOSPITAL ENCOUNTER (OUTPATIENT)
Dept: MRI IMAGING | Age: 30
Discharge: HOME | End: 2020-03-30
Payer: COMMERCIAL

## 2020-03-30 DIAGNOSIS — M25.532 PAIN IN LEFT WRIST: ICD-10-CM

## 2020-03-30 PROCEDURE — 73221 MRI JOINT UPR EXTREM W/O DYE: CPT

## 2020-05-05 ENCOUNTER — APPOINTMENT (OUTPATIENT)
Dept: VASCULAR SURGERY | Age: 30
End: 2020-05-05
Attending: STUDENT IN AN ORGANIZED HEALTH CARE EDUCATION/TRAINING PROGRAM
Payer: COMMERCIAL

## 2020-05-05 ENCOUNTER — HOSPITAL ENCOUNTER (EMERGENCY)
Age: 30
Discharge: HOME | End: 2020-05-05
Attending: STUDENT IN AN ORGANIZED HEALTH CARE EDUCATION/TRAINING PROGRAM | Admitting: STUDENT IN AN ORGANIZED HEALTH CARE EDUCATION/TRAINING PROGRAM
Payer: COMMERCIAL

## 2020-05-05 VITALS
HEART RATE: 74 BPM | HEIGHT: 78 IN | SYSTOLIC BLOOD PRESSURE: 128 MMHG | WEIGHT: 315 LBS | DIASTOLIC BLOOD PRESSURE: 90 MMHG | OXYGEN SATURATION: 94 % | BODY MASS INDEX: 36.45 KG/M2 | TEMPERATURE: 97.6 F | RESPIRATION RATE: 20 BRPM

## 2020-05-05 DIAGNOSIS — L03.116 CELLULITIS OF LEFT LOWER EXTREMITY: Primary | ICD-10-CM

## 2020-05-05 PROCEDURE — 99284 EMERGENCY DEPT VISIT MOD MDM: CPT

## 2020-05-05 PROCEDURE — 93971 EXTREMITY STUDY: CPT

## 2020-05-05 RX ORDER — CEPHALEXIN 250 MG/1
500 CAPSULE ORAL
Status: DISCONTINUED | OUTPATIENT
Start: 2020-05-05 | End: 2020-05-05 | Stop reason: HOSPADM

## 2020-05-05 RX ORDER — CEPHALEXIN 500 MG/1
500 CAPSULE ORAL 4 TIMES DAILY
Qty: 28 CAP | Refills: 0 | Status: SHIPPED | OUTPATIENT
Start: 2020-05-05 | End: 2020-05-12

## 2020-05-05 NOTE — ED PROVIDER NOTES
Chief Complaint   Patient presents with    Leg Swelling     This is a 59-year-old male presenting with pain across his distal left leg, anterior and posterior, started 3 days ago about a day after he did a rowing workout at home. He started taking aspirin and applied compression stockings thinking he might have a DVT, since then the pain and swelling has improved, but he called a Telehealth provided who advised him to seek care today as he still had some residual pain. No fevers, chest pain or shortness of breath at present, vomiting, abdominal pain, or syncope. No hx DVT/PE, recent surgery/hospitalization, travel, or hx prior malignancy. He's been able to walk without difficulty. Symptoms are moderate in nature without exacerbating factors.     Past Medical History:   Diagnosis Date    Gastroesophageal reflux      Past Surgical History:   Procedure Laterality Date    HX ORTHOPAEDIC  2008    left ring finger     HX ORTHOPAEDIC  2015    L great toe         Family History:   Problem Relation Age of Onset    Hypertension Maternal Grandmother        Social History     Socioeconomic History    Marital status: SINGLE     Spouse name: Not on file    Number of children: Not on file    Years of education: Not on file    Highest education level: Not on file   Occupational History    Not on file   Social Needs    Financial resource strain: Not on file    Food insecurity     Worry: Not on file     Inability: Not on file    Transportation needs     Medical: Not on file     Non-medical: Not on file   Tobacco Use    Smoking status: Never Smoker    Smokeless tobacco: Never Used   Substance and Sexual Activity    Alcohol use: Yes     Comment: socially maybe 1 a month    Drug use: No    Sexual activity: Not on file   Lifestyle    Physical activity     Days per week: Not on file     Minutes per session: Not on file    Stress: Not on file   Relationships    Social connections     Talks on phone: Not on file Gets together: Not on file     Attends [employer] service: Not on file     Active member of club or organization: Not on file     Attends meetings of clubs or organizations: Not on file     Relationship status: Not on file    Intimate partner violence     Fear of current or ex partner: Not on file     Emotionally abused: Not on file     Physically abused: Not on file     Forced sexual activity: Not on file   Other Topics Concern    Not on file   Social History Narrative    Not on file         ALLERGIES: Patient has no known allergies. Review of Systems   Constitutional: Negative for fever. HENT: Negative for facial swelling. Eyes: Negative for redness. Respiratory: Negative for cough. Cardiovascular: Positive for leg swelling. Gastrointestinal: Negative for abdominal pain and vomiting. Genitourinary: Negative for difficulty urinating. Musculoskeletal: Positive for myalgias. Negative for back pain. Skin: Negative for rash. Neurological: Negative for headaches.        Vitals:    05/05/20 0844 05/05/20 0915 05/05/20 0945 05/05/20 1045   BP: 146/79 121/89 137/80 128/90   Pulse: 74      Resp: 20      Temp: 97.6 °F (36.4 °C)      SpO2: 98% 93% 96% 94%   Weight: 158.8 kg (350 lb)      Height: 6' 9\" (2.057 m)               Physical Exam   General:  Awake and alert, NAD  HEENT:  NC/AT, equal pupils, moist mucous membranes  Neck:   Normal inspection, full range of motion  Cardiac:  RRR, no murmurs  Respiratory:  Clear bilaterally, no wheezes, rales, rhonchi  Abdomen:  Soft and nontender, nondistended  Extremities: Warm and well perfused, some scarring in the distal left leg with mild soft tissue swelling and erythema with warmth to palpation across the pretibial soft tissue of the distal left leg extending posteriorly and across the medial aspect of the right foot and ankle, no fluctuance/open wounds, compartments soft throughout, NV intact distally  Neuro:  Moving all extremities symmetrically without gross motor deficit  Skin:   No rashes or pallor    RESULTS  No results found for this or any previous visit (from the past 12 hour(s)). IMAGING  No results found. DUPLEX LOWER EXTREMITY VENOUS  Left lower extremity venous duplex negative for deep venous thrombosis or thrombophlebitis. Right common femoral vein is thrombus free. Prominent lymph node noted in the left groin, measuring 4.52 x 1.28 cm. Procedures - none unless documented below    ED course: US negative for DVT, prominent lymph node noted in the left groin, no signs of abscess on exam.  Will have him continue Keflex QID for cellulitis. No systemic signs of illness. Discussed strict return precautions, will discharge home. The patient has been given verbal and written advice regarding today's presentation including reasons to re-attend, specifically signs and symptoms of concern or worsening condition were discussed and understood by the patient.      Impression: Cellulitis of the left leg  Disposition: Discharge home

## 2020-05-05 NOTE — ED NOTES
I have reviewed discharge instructions and rx with the patient. The patient verbalized understanding.

## 2020-05-05 NOTE — ED TRIAGE NOTES
Left leg swelling and pain with touch. Pt did telehealth and was sent here for ruleout of blood clot. No injury.

## 2020-05-05 NOTE — LETTER
[address] 
[facility] EMERGENCY DEPT 
[address] [place] [address] [phone] Work/School Note Date: 5/5/2020 To Whom It May concern: 
 
Dion Saravia was seen and treated today in the emergency room by the following provider(s): 
Attending Provider: Mima Ponce MD. Dion Saravia may return to work on 05/06/2020.  
 
Sincerely, 
 
 
 
 
Tierra Rogers RN

## 2020-05-05 NOTE — DISCHARGE INSTRUCTIONS
- Complete the full course of antibiotics as discussed  - Continue Motrin (600 mg every 6 hours with food or milk) as needed for pain  - Ultrasound today was negative for any signs of DVT (blood clots)  - Follow up with your doctor as soon as possible to be reevaluated.   Return immediately for severe or worsening pain or swelling in the affected leg, redness streaking past the area of infection, chest pain, shortness of breath, lightheadedness or dizziness, or difficulty walking or bearing weight on the affected extremity due to pain

## 2022-01-27 ENCOUNTER — HOSPITAL ENCOUNTER (OUTPATIENT)
Dept: GENERAL RADIOLOGY | Age: 32
Discharge: HOME | End: 2022-01-27
Payer: COMMERCIAL

## 2022-01-27 ENCOUNTER — TRANSCRIBE ORDER (OUTPATIENT)
Dept: REGISTRATION | Age: 32
End: 2022-01-27

## 2022-01-27 DIAGNOSIS — M54.50 CHRONIC LOW BACK PAIN: ICD-10-CM

## 2022-01-27 DIAGNOSIS — M25.571 RIGHT ANKLE PAIN: ICD-10-CM

## 2022-01-27 DIAGNOSIS — M25.571 RIGHT ANKLE PAIN: Primary | ICD-10-CM

## 2022-01-27 DIAGNOSIS — M25.572 LEFT ANKLE PAIN: ICD-10-CM

## 2022-01-27 DIAGNOSIS — G89.29 CHRONIC LOW BACK PAIN: ICD-10-CM

## 2022-01-27 PROCEDURE — 73600 X-RAY EXAM OF ANKLE: CPT

## 2022-01-27 PROCEDURE — 72100 X-RAY EXAM L-S SPINE 2/3 VWS: CPT

## 2022-01-27 PROCEDURE — 73560 X-RAY EXAM OF KNEE 1 OR 2: CPT

## 2022-03-18 PROBLEM — L40.9 PSORIASIS: Status: ACTIVE | Noted: 2017-05-24

## 2022-03-19 PROBLEM — S83.241A TEAR OF MEDIAL MENISCUS OF RIGHT KNEE: Status: ACTIVE | Noted: 2017-05-24

## 2022-03-19 PROBLEM — M67.51 PLICA SYNDROME OF RIGHT KNEE: Status: ACTIVE | Noted: 2017-05-24

## 2023-05-25 RX ORDER — IBUPROFEN 200 MG
400 TABLET ORAL EVERY 6 HOURS PRN
COMMUNITY

## 2023-10-28 ENCOUNTER — HOSPITAL ENCOUNTER (EMERGENCY)
Facility: HOSPITAL | Age: 33
Discharge: HOME | End: 2023-10-28
Attending: EMERGENCY MEDICINE
Payer: COMMERCIAL

## 2023-10-28 ENCOUNTER — APPOINTMENT (OUTPATIENT)
Facility: HOSPITAL | Age: 33
End: 2023-10-28
Payer: COMMERCIAL

## 2023-10-28 VITALS
HEIGHT: 78 IN | OXYGEN SATURATION: 99 % | TEMPERATURE: 98.5 F | RESPIRATION RATE: 18 BRPM | DIASTOLIC BLOOD PRESSURE: 94 MMHG | BODY MASS INDEX: 36.45 KG/M2 | HEART RATE: 77 BPM | WEIGHT: 315 LBS | SYSTOLIC BLOOD PRESSURE: 151 MMHG

## 2023-10-28 DIAGNOSIS — M51.36 DEGENERATIVE DISC DISEASE, LUMBAR: Primary | ICD-10-CM

## 2023-10-28 PROCEDURE — 2580000003 HC RX 258: Performed by: NURSE PRACTITIONER

## 2023-10-28 PROCEDURE — 72100 X-RAY EXAM L-S SPINE 2/3 VWS: CPT

## 2023-10-28 PROCEDURE — 6370000000 HC RX 637 (ALT 250 FOR IP): Performed by: NURSE PRACTITIONER

## 2023-10-28 PROCEDURE — 96372 THER/PROPH/DIAG INJ SC/IM: CPT

## 2023-10-28 PROCEDURE — 99284 EMERGENCY DEPT VISIT MOD MDM: CPT

## 2023-10-28 PROCEDURE — 6360000002 HC RX W HCPCS: Performed by: NURSE PRACTITIONER

## 2023-10-28 RX ORDER — CYCLOBENZAPRINE HCL 10 MG
10 TABLET ORAL ONCE
Status: COMPLETED | OUTPATIENT
Start: 2023-10-28 | End: 2023-10-28

## 2023-10-28 RX ORDER — PREDNISONE 50 MG/1
50 TABLET ORAL DAILY
Qty: 5 TABLET | Refills: 0 | Status: SHIPPED | OUTPATIENT
Start: 2023-10-29 | End: 2023-11-03

## 2023-10-28 RX ORDER — HYDROCODONE BITARTRATE AND ACETAMINOPHEN 5; 325 MG/1; MG/1
1 TABLET ORAL
Status: COMPLETED | OUTPATIENT
Start: 2023-10-28 | End: 2023-10-28

## 2023-10-28 RX ORDER — CYCLOBENZAPRINE HCL 10 MG
10 TABLET ORAL 3 TIMES DAILY PRN
Qty: 15 TABLET | Refills: 0 | Status: SHIPPED | OUTPATIENT
Start: 2023-10-28 | End: 2023-11-07

## 2023-10-28 RX ADMIN — HYDROCODONE BITARTRATE AND ACETAMINOPHEN 1 TABLET: 5; 325 TABLET ORAL at 14:14

## 2023-10-28 RX ADMIN — WATER 125 MG: 1 INJECTION INTRAMUSCULAR; INTRAVENOUS; SUBCUTANEOUS at 11:47

## 2023-10-28 RX ADMIN — CYCLOBENZAPRINE 10 MG: 10 TABLET, FILM COATED ORAL at 11:46

## 2023-10-28 ASSESSMENT — PAIN DESCRIPTION - ORIENTATION: ORIENTATION: LOWER

## 2023-10-28 ASSESSMENT — PAIN - FUNCTIONAL ASSESSMENT: PAIN_FUNCTIONAL_ASSESSMENT: 0-10

## 2023-10-28 ASSESSMENT — ENCOUNTER SYMPTOMS
VOMITING: 0
COUGH: 0
EYE REDNESS: 0
SHORTNESS OF BREATH: 0
ABDOMINAL DISTENTION: 0
BACK PAIN: 1
ABDOMINAL PAIN: 0
NAUSEA: 0

## 2023-10-28 ASSESSMENT — PAIN SCALES - GENERAL: PAINLEVEL_OUTOF10: 10

## 2023-10-28 ASSESSMENT — PAIN DESCRIPTION - LOCATION: LOCATION: BACK

## 2023-10-28 NOTE — ED TRIAGE NOTES
Patient in wheelchair to triage. Reports lower back pain since yesterday after normal activity of walking and playing with his dog. Denies any numbness/tingling or loss of bowel or bladder. Has tried using icy hot.

## 2023-10-28 NOTE — ED NOTES
Patient does not appear to be in any acute distress/shows no evidence of clinical instability at this time. Provider has reviewed discharge instructions with the patient/family. The patient/family verbalized understanding instructions as well as need for follow up for any further symptoms.      Discharge papers given, education provided, and any questions answered. Eren Baltazar RN  10/28/23 1599

## 2023-10-28 NOTE — ED PROVIDER NOTES
Laterality Date    ORTHOPEDIC SURGERY  2008    left ring finger     ORTHOPEDIC SURGERY  2015    L great toe         CURRENT MEDICATIONS       Previous Medications    IBUPROFEN (ADVIL;MOTRIN) 200 MG TABLET    Take 2 tablets by mouth every 6 hours as needed       ALLERGIES     Patient has no known allergies. FAMILY HISTORY       Family History   Problem Relation Age of Onset    Hypertension Maternal Grandmother           SOCIAL HISTORY       Social History     Socioeconomic History    Marital status: Single   Tobacco Use    Smoking status: Never    Smokeless tobacco: Never   Substance and Sexual Activity    Alcohol use: Yes    Drug use: No         PHYSICAL EXAM       ED Triage Vitals   BP Temp Temp src Pulse Resp SpO2 Height Weight   -- -- -- -- -- -- -- --       There is no height or weight on file to calculate BMI. Physical Exam  Vitals and nursing note reviewed. Constitutional:       Appearance: Normal appearance. He is not ill-appearing. HENT:      Head: Normocephalic and atraumatic. Right Ear: External ear normal.      Left Ear: External ear normal.      Nose: Nose normal. No congestion. Mouth/Throat:      Mouth: Mucous membranes are moist.   Eyes:      General:         Right eye: No discharge. Left eye: No discharge. Conjunctiva/sclera: Conjunctivae normal.      Pupils: Pupils are equal, round, and reactive to light. Cardiovascular:      Rate and Rhythm: Normal rate. Pulses: Normal pulses. Pulmonary:      Effort: Pulmonary effort is normal. No respiratory distress. Chest:      Chest wall: No tenderness. Abdominal:      General: Bowel sounds are normal. There is no distension. Palpations: Abdomen is soft. Tenderness: There is no abdominal tenderness. There is no guarding.    Genitourinary:     Comments: No loss of sensation for bowel or bladder, no bowel or bladder incontinence    Musculoskeletal:         General: Tenderness (paraspinous lumbar back pain on Patient and wife in agreement with plan of care and will follow-up as an outpatient. Any available vitals, labs, images, nursing notes, medications, allergies, PMH, PSH and/or previous records in the chart were reviewed. All of these were considered in the medical decision making process. I individually reviewed any labs and any images obtained. This was discussed with my attending and he/she is in agreement with plan of care. Problems Addressed:  Degenerative disc disease, lumbar: acute illness or injury    Amount and/or Complexity of Data Reviewed  Radiology: ordered. Decision-making details documented in ED Course. Risk  Prescription drug management. REASSESSMENT          CONSULTS:  None    PROCEDURES:     Procedures    Labs Reviewed - No data to display    XR LUMBAR SPINE (2-3 VIEWS)   Final Result         1. No acute bony abnormalities. 2.  Mild degenerative disc disease. 1:30 PM  Pt has been reexamined. Pt has no new complaints, changes or physical findings. Care plan outlined and precautions discussed. All available results were reviewed with pt. All medications were reviewed with pt. All of pt's questions and concerns were addressed. Pt agrees to F/U as instructed and agrees to return to ED upon further deterioration. Pt is ready to go home.   PATRICIA Hesetr NP, APRN - NP  10/28/23 [id number]

## (undated) DEVICE — MAT SUCT W36XL48IN FLD CTRL DISP

## (undated) DEVICE — SUPER TURBOVAC 90 INTEGRATED CABLE WAND ICW: Brand: COBLATION

## (undated) DEVICE — ARTHROSCOPY RICHMOND-LF: Brand: MEDLINE INDUSTRIES, INC.

## (undated) DEVICE — 10K/24K ARTHROSCOPY INFLOW TUBE SET: Brand: 10K/24K

## (undated) DEVICE — SUT ETHLN 3-0 18IN PS2 BLK --

## (undated) DEVICE — 3M™ STERI-DRAPE™ U-DRAPE 1015: Brand: STERI-DRAPE™

## (undated) DEVICE — (D)PREP SKN CHLRAPRP APPL 26ML -- CONVERT TO ITEM 371833

## (undated) DEVICE — DRAPE,EXTREMITY,89X128,STERILE: Brand: MEDLINE

## (undated) DEVICE — 3.5 MM FULL RADIUS STRAIGHT                                    BLADES, POWER/EP-1, BEIGE, PACKAGED                                    6 PER BOX, STERILE

## (undated) DEVICE — SOLUTION IRRIG 3000ML LAC R FLX CONT

## (undated) DEVICE — DEVON™ KNEE AND BODY STRAP 60" X 3" (1.5 M X 7.6 CM): Brand: DEVON

## (undated) DEVICE — GAUZE SPONGES,12 PLY: Brand: CURITY

## (undated) DEVICE — ZIMMER® STERILE DISPOSABLE TOURNIQUET CUFF WITH PROTECTIVE SLEEVE AND PLC, DUAL PORT, SINGLE BLADDER, 34 IN. (86 CM)

## (undated) DEVICE — STERILE POLYISOPRENE POWDER-FREE SURGICAL GLOVES: Brand: PROTEXIS

## (undated) DEVICE — MEDI-VAC NON-CONDUCTIVE SUCTION TUBING: Brand: CARDINAL HEALTH

## (undated) DEVICE — STERILE POLYISOPRENE POWDER-FREE SURGICAL GLOVES WITH EMOLLIENT COATING: Brand: PROTEXIS

## (undated) DEVICE — GOWN,SIRUS,NONRNF,SETINSLV,2XL,18/CS: Brand: MEDLINE

## (undated) DEVICE — ABDOMINAL PAD: Brand: DERMACEA

## (undated) DEVICE — 5.5 MM FULL RADIUS STRAIGHT                                    BLADES, POWER/EP-1, ORANGE, PACKAGED                                    6 PER BOX, STERILE

## (undated) DEVICE — DYONICS 25 LUER-TO-COLDER ADAPTOR                                    INTELIJET CANNULA ADAPTOR, 12 PER BOX

## (undated) DEVICE — HOOK LOCK LATEX FREE ELASTIC BANDAGE 4INX5YD

## (undated) DEVICE — CLEAR-TRAC DISPOSABLE STOPCOCK: Brand: CLEAR-TRAC

## (undated) DEVICE — INFECTION CONTROL KIT SYS

## (undated) DEVICE — BNDG ADHESIVE FABRIC 2X3.5IN -- CONVERT TO ITEM 357955

## (undated) DEVICE — 4-PORT MANIFOLD: Brand: NEPTUNE 2